# Patient Record
Sex: FEMALE | Race: BLACK OR AFRICAN AMERICAN | NOT HISPANIC OR LATINO | Employment: FULL TIME | URBAN - METROPOLITAN AREA
[De-identification: names, ages, dates, MRNs, and addresses within clinical notes are randomized per-mention and may not be internally consistent; named-entity substitution may affect disease eponyms.]

---

## 2023-07-03 ENCOUNTER — HOSPITAL ENCOUNTER (EMERGENCY)
Facility: HOSPITAL | Age: 63
Discharge: HOME | End: 2023-07-03
Attending: EMERGENCY MEDICINE
Payer: COMMERCIAL

## 2023-07-03 VITALS
DIASTOLIC BLOOD PRESSURE: 113 MMHG | OXYGEN SATURATION: 99 % | RESPIRATION RATE: 20 BRPM | SYSTOLIC BLOOD PRESSURE: 208 MMHG | TEMPERATURE: 97.6 F | HEART RATE: 89 BPM

## 2023-07-03 DIAGNOSIS — T18.108A FOREIGN BODY IN ESOPHAGUS, INITIAL ENCOUNTER: ICD-10-CM

## 2023-07-03 DIAGNOSIS — R09.89 GLOBUS SENSATION: Primary | ICD-10-CM

## 2023-07-03 PROCEDURE — 99283 EMERGENCY DEPT VISIT LOW MDM: CPT

## 2023-07-03 RX ORDER — SUCRALFATE 1 G/1
1 TABLET ORAL 4 TIMES DAILY
Qty: 28 TABLET | Refills: 0 | Status: SHIPPED | OUTPATIENT
Start: 2023-07-03 | End: 2023-07-10

## 2023-07-03 RX ORDER — OMEPRAZOLE 20 MG/1
20 CAPSULE, DELAYED RELEASE ORAL DAILY
Qty: 14 CAPSULE | Refills: 0 | Status: SHIPPED | OUTPATIENT
Start: 2023-07-03 | End: 2023-07-17

## 2023-07-03 RX ORDER — LIDOCAINE HYDROCHLORIDE 20 MG/ML
15 SOLUTION OROPHARYNGEAL ONCE
Status: COMPLETED | OUTPATIENT
Start: 2023-07-03 | End: 2023-07-03

## 2023-07-03 RX ORDER — SUCRALFATE 1 G/1
1 TABLET ORAL ONCE
Status: COMPLETED | OUTPATIENT
Start: 2023-07-03 | End: 2023-07-03

## 2023-07-03 RX ORDER — PANTOPRAZOLE SODIUM 40 MG/1
40 TABLET, DELAYED RELEASE ORAL ONCE
Status: COMPLETED | OUTPATIENT
Start: 2023-07-03 | End: 2023-07-03

## 2023-07-03 RX ORDER — MAGNESIUM HYDROXIDE/ALUMINUM HYDROXICE/SIMETHICONE 120; 1200; 1200 MG/30ML; MG/30ML; MG/30ML
30 SUSPENSION ORAL ONCE
Status: COMPLETED | OUTPATIENT
Start: 2023-07-03 | End: 2023-07-03

## 2023-07-03 RX ADMIN — ALUMINUM HYDROXIDE, MAGNESIUM HYDROXIDE, AND DIMETHICONE 30 ML: 200; 20; 200 SUSPENSION ORAL at 06:35

## 2023-07-03 RX ADMIN — PANTOPRAZOLE SODIUM 40 MG: 40 TABLET, DELAYED RELEASE ORAL at 07:54

## 2023-07-03 RX ADMIN — SUCRALFATE 1 G: 1 TABLET ORAL at 07:53

## 2023-07-03 RX ADMIN — Medication 15 ML: at 06:35

## 2023-07-03 NOTE — ED NOTES
Patient provided w/ carbonated beverage to attempt to dislodge foreign body (per provider Shugars)     Nina Aden RN  07/03/23 4129

## 2023-07-03 NOTE — ED PROVIDER NOTES
History  Chief Complaint   Patient presents with   • Foreign Body in Throat     Pt reports taking vitamins this morning and they are stuck in her throat     Patient is a 24-year-old female with PMH of HTN presenting for evaluation of foreign body in throat. She reports she was taking her multivitamins this morning, a total of 3 pills, when she feels that they clumped together and got stuck in her throat. 2 are capsules and 1 is a tab. She notes despite drinking a glass of water she could not pass the pills. She notes an increase in coughing and feeling like there is a noisiness of her breathing since getting the pill stuck. She feels the foreign body sensation on the right side of her throat. She denies difficulty breathing and shortness of breath. She denies difficulty swallowing but endorses globus sensation and mildly painful swallowing. She denies other complaints. History provided by:  Patient   used: No        Prior to Admission Medications   Prescriptions Last Dose Informant Patient Reported? Taking?   valsartan (DIOVAN) 160 mg tablet   No No   Sig: Take 1 tablet (160 mg total) by mouth daily      Facility-Administered Medications: None       Past Medical History:   Diagnosis Date   • Hypertension        Past Surgical History:   Procedure Laterality Date   • HERNIA REPAIR         Family History   Problem Relation Age of Onset   • Hypertension Mother      I have reviewed and agree with the history as documented.     E-Cigarette/Vaping   • E-Cigarette Use Never User      E-Cigarette/Vaping Substances   • Nicotine No    • THC No    • CBD No    • Flavoring No    • Other No    • Unknown No      Social History     Tobacco Use   • Smoking status: Never   • Smokeless tobacco: Never   Vaping Use   • Vaping Use: Never used   Substance Use Topics   • Alcohol use: No   • Drug use: No       Review of Systems   HENT: Positive for sore throat (Right-sided) and voice change (More raspy than baseline per pt). Negative for trouble swallowing (Denies difficulty swallowing, slightly painful swallowing and globus sensation and right-sided throat). Respiratory: Positive for cough (Dry cough since ingestion of pills). Negative for choking, shortness of breath, wheezing and stridor (" Noisiness of breathing" since getting pill stuck in throat). Cardiovascular: Negative for chest pain. Gastrointestinal: Negative for abdominal pain, nausea and vomiting. Musculoskeletal: Positive for neck pain (Right-sided where she feels pills are stuck). Negative for neck stiffness. All other systems reviewed and are negative. Physical Exam  Physical Exam  Vitals and nursing note reviewed. Constitutional:       General: She is in acute distress (Evidencing mild discomfort and distress). Appearance: Normal appearance. She is not ill-appearing, toxic-appearing or diaphoretic. HENT:      Head: Normocephalic and atraumatic. Jaw: There is normal jaw occlusion. No trismus. Nose: Nose normal.      Mouth/Throat:      Lips: Pink. Mouth: Mucous membranes are moist. No injury or oral lesions. Dentition: Normal dentition. Tongue: No lesions. Palate: No lesions. Pharynx: Oropharynx is clear. Uvula midline. No pharyngeal swelling, oropharyngeal exudate, posterior oropharyngeal erythema or uvula swelling. Tonsils: No tonsillar exudate or tonsillar abscesses. 0 on the right. 0 on the left. Comments: No foreign body visualized in oropharynx  Eyes:      Extraocular Movements: Extraocular movements intact. Conjunctiva/sclera: Conjunctivae normal.   Neck:      Trachea: Trachea normal. No tracheal tenderness or abnormal tracheal secretions. Cardiovascular:      Rate and Rhythm: Normal rate. Pulses:           Radial pulses are 2+ on the right side and 2+ on the left side.       Heart sounds: Normal heart sounds, S1 normal and S2 normal.   Pulmonary:      Effort: Pulmonary effort is normal. No tachypnea, accessory muscle usage, respiratory distress or retractions. Breath sounds: Normal breath sounds and air entry. No stridor (No other but slightly turbulent airflow through bilateral sides of neck), decreased air movement or transmitted upper airway sounds. No decreased breath sounds, wheezing, rhonchi or rales. Musculoskeletal:         General: Normal range of motion. Cervical back: Full passive range of motion without pain, normal range of motion and neck supple. Lymphadenopathy:      Cervical: No cervical adenopathy. Skin:     General: Skin is warm and dry. Capillary Refill: Capillary refill takes less than 2 seconds. Neurological:      General: No focal deficit present. Mental Status: She is alert and oriented to person, place, and time. Mental status is at baseline. GCS: GCS eye subscore is 4. GCS verbal subscore is 5. GCS motor subscore is 6.          Vital Signs  ED Triage Vitals   Temperature Pulse Respirations Blood Pressure SpO2   07/03/23 0558 07/03/23 0556 07/03/23 0556 07/03/23 0556 07/03/23 0556   97.6 °F (36.4 °C) 89 20 (!) 208/113 99 %      Temp Source Heart Rate Source Patient Position - Orthostatic VS BP Location FiO2 (%)   07/03/23 0558 07/03/23 0556 07/03/23 0556 07/03/23 0556 --   Oral Monitor Sitting Right arm       Pain Score       --                  Vitals:    07/03/23 0556   BP: (!) 208/113   Pulse: 89   Patient Position - Orthostatic VS: Sitting         Visual Acuity      ED Medications  Medications   Lidocaine Viscous HCl (XYLOCAINE) 2 % mucosal solution 15 mL (15 mL Swish & Spit Given 7/3/23 0635)   aluminum-magnesium hydroxide-simethicone (MYLANTA) oral suspension 30 mL (30 mL Oral Given 7/3/23 0635)   pantoprazole (PROTONIX) EC tablet 40 mg (40 mg Oral Given 7/3/23 6044)   sucralfate (CARAFATE) tablet 1 g (1 g Oral Given 7/3/23 7553)       Diagnostic Studies  Results Reviewed     None                 No orders to display              Procedures  Procedures         ED Course  ED Course as of 07/03/23 0811   [facility] Jul 03, 2023   0559 Triage vital signs notable for elevated BP of 208/113, otherwise within normal limits                                             Medical Decision Making  DDx including but not limited to: Esophageal foreign body, globus sensation; no airway compromise; considered but doubt esophageal spasm, contracted esophagus, or achalasia    Patient is a 57-year-old female presenting for evaluation of globus sensation and right-sided throat after taking 3 pills this morning. Despite drinking water at home she has been unable to pass the "clump" of pills. Her triage vital signs are notable for elevated BP for which she reports a history of HTN and takes valsartan. She notes not having BPs as high normally and has not taken her antihypertensive yet today. She attributes the elevated BP to anxiety/frustration with inability to pass the pills. She denies red flag symptoms of CP/SOB, headache, lightheadedness/dizziness, and vision changes. No indication to further work-up hypertension given asymptomatic. On physical exam, patient is evidencing mild discomfort and distress. Her physical exam is with slight increased turbulent flow through the upper airway, otherwise no stridor, difficulty tolerating secretions, or abnormal breath sounds. She is breathing comfortably and without drooling. She is protecting her airway. Doubt aspiration into trachea or lungs. Suspicion highest for esophageal foreign body versus globus sensation. Will trial Mylanta and lidocaine. On reassessment, patient notes foreign body sensation is lessening. She notes an ongoing coarse, spastic cough. Breath sounds remain clear throughout. She is breathing comfortably in no acute distress. She is tolerating liquids without difficulty or choking.   She notes she vomited 1 time after getting into a coughing fit and reports it appeared to be applesauce but no pills visualized. She denies nausea currently and demonstrates swallowing in front of this provider without difficulty or choking. Plan made for omeprazole and Carafate (dissolved prior to ingestion) Rx. Patient educated on very strict return precautions including new or worsening pain, difficulty swallowing, difficulty breathing, choking, drooling, fevers, and vomiting. She is in agreement with plan and has no further questions at this time. Patient with improved appearance, in no acute distress, and ambulatory with steady gait at time of discharge. Foreign body in esophagus, initial encounter: acute illness or injury  Globus sensation: acute illness or injury  Risk  OTC drugs. Prescription drug management. Disposition  Final diagnoses:   Globus sensation   Foreign body in esophagus, initial encounter     Time reflects when diagnosis was documented in both MDM as applicable and the Disposition within this note     Time User Action Codes Description Comment    7/3/2023  7:39 AM Lia Sheri Add [R09.89] Globus sensation     7/3/2023  7:41 AM Lia Sheri Add [U20.994B] Foreign body in esophagus, initial encounter       ED Disposition     ED Disposition   Discharge    Condition   Stable    Date/Time   Mon Jul 3, 2023  7:39 AM    Comment   Shawn Altman discharge to home/self care.                Follow-up Information     Follow up With Specialties Details Why Contact Info Additional Information    Christopher Young MD Family Medicine Schedule an appointment as soon as possible for a visit in 2 days  [address]  [address] [address]  [phone]       [address] [place] [address] Way Emergency Department Emergency Medicine Go to  If symptoms worsen [address], [place], [place], [address]          Discharge Medication List as of 7/3/2023  7:44 AM      START taking these medications    Details   omeprazole (PriLOSEC) 20 mg delayed release capsule Take 1 capsule (20 mg total) by mouth daily for 14 days, Starting Mon 7/3/2023, Until Mon 7/17/2023, Normal      sucralfate (CARAFATE) 1 g tablet Take 1 tablet (1 g total) by mouth 4 (four) times a day for 7 days, Starting Mon 7/3/2023, Until Mon 7/10/2023, Normal         CONTINUE these medications which have NOT CHANGED    Details   valsartan (DIOVAN) 160 mg tablet Take 1 tablet (160 mg total) by mouth daily, Starting Tue 8/2/2022, Normal             No discharge procedures on file.     PDMP Review     None          ED Provider  Electronically Signed by           Qian Wilson [address] [place] [address]  07/03/23 1751

## 2023-07-26 ENCOUNTER — VBI (OUTPATIENT)
Dept: ADMINISTRATIVE | Facility: OTHER | Age: 63
End: 2023-07-26

## 2023-12-14 ENCOUNTER — VBI (OUTPATIENT)
Dept: ADMINISTRATIVE | Facility: OTHER | Age: 63
End: 2023-12-14

## 2024-01-10 ENCOUNTER — HOSPITAL ENCOUNTER (OUTPATIENT)
Dept: RADIOLOGY | Facility: HOSPITAL | Age: 64
Discharge: HOME | End: 2024-01-10
Payer: COMMERCIAL

## 2024-01-10 ENCOUNTER — OFFICE VISIT (OUTPATIENT)
Dept: FAMILY MEDICINE CLINIC | Facility: CLINIC | Age: 64
End: 2024-01-10
Payer: COMMERCIAL

## 2024-01-10 VITALS
BODY MASS INDEX: 29.16 KG/M2 | TEMPERATURE: 97.4 F | DIASTOLIC BLOOD PRESSURE: 70 MMHG | RESPIRATION RATE: 13 BRPM | OXYGEN SATURATION: 97 % | WEIGHT: 175 LBS | HEIGHT: 65 IN | SYSTOLIC BLOOD PRESSURE: 148 MMHG | HEART RATE: 64 BPM

## 2024-01-10 DIAGNOSIS — R22.31 LOCALIZED SWELLING ON RIGHT HAND: Primary | ICD-10-CM

## 2024-01-10 DIAGNOSIS — R22.31 LOCALIZED SWELLING ON RIGHT HAND: ICD-10-CM

## 2024-01-10 DIAGNOSIS — I10 ESSENTIAL HYPERTENSION: ICD-10-CM

## 2024-01-10 PROCEDURE — 99214 OFFICE O/P EST MOD 30 MIN: CPT

## 2024-01-10 PROCEDURE — 73130 X-RAY EXAM OF HAND: CPT

## 2024-01-10 RX ORDER — METHYLPREDNISOLONE 4 MG/1
TABLET ORAL
Qty: 21 EACH | Refills: 0 | Status: SHIPPED | OUTPATIENT
Start: 2024-01-10

## 2024-01-10 NOTE — PROGRESS NOTES
Name: Perla Mccall      : 1960      MRN: [id number]  Encounter Provider: MARY ELLEN Turner  Encounter Date: 1/10/2024   Encounter department: [facility] MEDICINE    Assessment & Plan     1. Localized swelling on right hand  Assessment & Plan:  R hand swelling and pain x 4 days not improving with NSAID. Pt states has tried compression therapy during daytime. Pt reports increased activity on Saturday requiring use of hand however no injury or increased sodium intake. Pt reports similar issue a few years ago that resolved with NSAID use. On exam pt with sever tenderness of R hand and fingers, moderate swelling of hand and fingers with decreased ROM, neuro exam wnl. Start medrol pack as prescribed recommend extra strength tylenol for pain,  wrist splint. Will check xray and check for autoimmune and rheumatoid disease.    Orders:  -     methylPREDNISolone 4 MG tablet therapy pack; Use as directed on package  -     XR hand 3+ vw right; Future; Expected date: 01/10/2024  -     SADE Comprehensive Panel; Future  -     RF Screen w/ Reflex to Titer; Future  -     CBC; Future    2. Essential hypertension  Assessment & Plan:  BP elevated in office pt no longer taking valsartan 160 mg. Pt reports stable BP denies high Na diet, will monitor and f/u at physical appt to discuss restarting medication. Check CMP.     Orders:  -     Comprehensive metabolic panel; Future; Expected date: 2024           Subjective      Pt states was taking her tree down on Saturday then proceeded to wash her hair and on  woke up with swollen R hand and fingers. Pt has been taking OTC NSAID and wrapping hand however swelling has not decreased. On exam pt with moderate swelling and decreased ROM fingers, denies any injury or high Na intake. Will check images and check autoimmune and inflammatory labs.      Review of Systems   Constitutional:  Negative for activity change, chills, fatigue and fever.  "  HENT:  Negative for facial swelling.    Eyes:  Positive for discharge.   Respiratory:  Negative for chest tightness.    Cardiovascular:  Negative for leg swelling.   Gastrointestinal:  Negative for abdominal distention and nausea.   Endocrine: Negative for cold intolerance.   Musculoskeletal:  Positive for arthralgias and joint swelling. Negative for back pain and myalgias.   Skin:  Negative for color change, pallor and wound.   Allergic/Immunologic: Negative for environmental allergies.   Neurological:  Negative for dizziness, tremors, syncope, facial asymmetry, weakness, light-headedness, numbness and headaches.   Psychiatric/Behavioral:  Negative for agitation.        Current Outpatient Medications on File Prior to Visit   Medication Sig    valsartan (DIOVAN) 160 mg tablet Take 1 tablet (160 mg total) by mouth daily    omeprazole (PriLOSEC) 20 mg delayed release capsule Take 1 capsule (20 mg total) by mouth daily for 14 days (Patient not taking: Reported on 1/10/2024)    sucralfate (CARAFATE) 1 g tablet Take 1 tablet (1 g total) by mouth 4 (four) times a day for 7 days (Patient not taking: Reported on 1/10/2024)       Objective     /70 (BP Location: Left arm, Patient Position: Sitting, Cuff Size: Standard)   Pulse 64   Temp (!) 97.4 °F (36.3 °C) (Tympanic)   Resp 13   Ht 5' 5\" (1.651 m)   Wt 79.4 kg (175 lb)   SpO2 97%   BMI 29.12 kg/m²     Physical Exam  Vitals and nursing note reviewed.   Constitutional:       General: She is not in acute distress.     Appearance: Normal appearance. She is not ill-appearing, toxic-appearing or diaphoretic.   HENT:      Head: Normocephalic and atraumatic.      Right Ear: There is no impacted cerumen.      Left Ear: There is no impacted cerumen.      Nose: Nose normal. No congestion.      Mouth/Throat:      Mouth: Mucous membranes are moist.      Pharynx: Oropharynx is clear.   Eyes:      Conjunctiva/sclera: Conjunctivae normal.      Pupils: Pupils are equal, " round, and reactive to light.   Cardiovascular:      Rate and Rhythm: Normal rate and regular rhythm.      Pulses: Normal pulses.      Heart sounds: Normal heart sounds.   Pulmonary:      Effort: Pulmonary effort is normal. No respiratory distress.      Breath sounds: Normal breath sounds. No stridor. No wheezing, rhonchi or rales.   Chest:      Chest wall: No tenderness.   Abdominal:      General: Bowel sounds are normal.      Tenderness: There is no abdominal tenderness.   Musculoskeletal:         General: Swelling and tenderness present. No deformity or signs of injury.      Right wrist: No swelling, bony tenderness or crepitus. Normal range of motion. Normal pulse.      Right hand: Swelling, tenderness and bony tenderness present. No deformity or lacerations. Decreased range of motion. Normal strength. Normal sensation. There is no disruption of two-point discrimination. Normal capillary refill. Normal pulse.      Cervical back: Normal range of motion.   Skin:     General: Skin is warm.      Capillary Refill: Capillary refill takes less than 2 seconds.      Coloration: Skin is not jaundiced or pale.      Findings: No bruising, erythema, lesion or rash.   Neurological:      Mental Status: She is alert and oriented to person, place, and time.      Cranial Nerves: No cranial nerve deficit.      Motor: No weakness.   Psychiatric:         Mood and Affect: Mood normal.         Behavior: Behavior normal.       MARY ELLEN Turner

## 2024-01-10 NOTE — ASSESSMENT & PLAN NOTE
BP elevated in office pt no longer taking valsartan 160 mg. Pt reports stable BP denies high Na diet, will monitor and f/u at physical appt to discuss restarting medication. Check CMP.

## 2024-01-10 NOTE — ASSESSMENT & PLAN NOTE
R hand swelling and pain x 4 days not improving with NSAID. Pt states has tried compression therapy during daytime. Pt reports increased activity on Saturday requiring use of hand however no injury or increased sodium intake. Pt reports similar issue a few years ago that resolved with NSAID use. On exam pt with sever tenderness of R hand and fingers, moderate swelling of hand and fingers with decreased ROM, neuro exam wnl. Start medrol pack as prescribed recommend extra strength tylenol for pain,  wrist splint. Will check xray and check for autoimmune and rheumatoid disease.

## 2024-01-10 NOTE — LETTER
January 10, 2024     Patient: Perla Mccall  YOB: 1960  Date of Visit: 1/10/2024      To Whom it May Concern:    Perla Mccall is under my professional care. Perla was seen in my office on 1/10/2024. Please excuse Perla from work 1/10-1/12/24 may return to work on 1/15/24 .    If you have any questions or concerns, please don't hesitate to call.         Sincerely,          MARY ELLEN Turner

## 2024-01-16 ENCOUNTER — TELEPHONE (OUTPATIENT)
Age: 64
End: 2024-01-16

## 2024-01-16 NOTE — TELEPHONE ENCOUNTER
Patient called in for results to labs ordered on 1/10/24; reports labs completed on Thursday 1/11 at [facility]. RN could not find results sent over and scanned into chart. Patient will follow up with [facility] labs.

## 2024-01-17 ENCOUNTER — TELEPHONE (OUTPATIENT)
Age: 64
End: 2024-01-17

## 2024-01-17 NOTE — TELEPHONE ENCOUNTER
Patient stated that she went last week on Thursday for her labs. Patient stated that she went to [facility] in [place].  Natalia Garrett   Please check [facility] portal for this. And call patient back

## 2024-01-18 LAB
ANA PAT SER IF-IMP: ABNORMAL
ANA PAT SER-IMP: ABNORMAL
ANA SER QL IF: POSITIVE
ANA TITR SER IF: ABNORMAL TITER
ANA TITR SER IF: ABNORMAL TITER
BASOPHILS # BLD AUTO: 53 CELLS/UL (ref 0–200)
BASOPHILS NFR BLD AUTO: 0.5 %
EOSINOPHIL # BLD AUTO: 105 CELLS/UL (ref 15–500)
EOSINOPHIL NFR BLD AUTO: 1 %
ERYTHROCYTE [DISTWIDTH] IN BLOOD BY AUTOMATED COUNT: 13.7 % (ref 11–15)
HCT VFR BLD AUTO: 41.8 % (ref 35–45)
HGB BLD-MCNC: 13.9 G/DL (ref 11.7–15.5)
LYMPHOCYTES # BLD AUTO: 2720 CELLS/UL (ref 850–3900)
LYMPHOCYTES NFR BLD AUTO: 25.9 %
MCH RBC QN AUTO: 28.8 PG (ref 27–33)
MCHC RBC AUTO-ENTMCNC: 33.3 G/DL (ref 32–36)
MCV RBC AUTO: 86.7 FL (ref 80–100)
MONOCYTES # BLD AUTO: 714 CELLS/UL (ref 200–950)
MONOCYTES NFR BLD AUTO: 6.8 %
NEUTROPHILS # BLD AUTO: 6909 CELLS/UL (ref 1500–7800)
NEUTROPHILS NFR BLD AUTO: 65.8 %
PLATELET # BLD AUTO: 250 THOUSAND/UL (ref 140–400)
PMV BLD REES-ECKER: 10.8 FL (ref 7.5–12.5)
RBC # BLD AUTO: 4.82 MILLION/UL (ref 3.8–5.1)
RHEUMATOID FACT SERPL-ACNC: <14 IU/ML
WBC # BLD AUTO: 10.5 THOUSAND/UL (ref 3.8–10.8)

## 2024-01-19 DIAGNOSIS — R76.8 POSITIVE ANA (ANTINUCLEAR ANTIBODY): Primary | ICD-10-CM

## 2024-01-22 ENCOUNTER — TELEPHONE (OUTPATIENT)
Age: 64
End: 2024-01-22

## 2024-03-29 ENCOUNTER — OFFICE VISIT (OUTPATIENT)
Dept: FAMILY MEDICINE CLINIC | Facility: CLINIC | Age: 64
End: 2024-03-29
Payer: COMMERCIAL

## 2024-03-29 VITALS
HEART RATE: 71 BPM | OXYGEN SATURATION: 95 % | HEIGHT: 66 IN | DIASTOLIC BLOOD PRESSURE: 120 MMHG | TEMPERATURE: 97.9 F | WEIGHT: 182 LBS | SYSTOLIC BLOOD PRESSURE: 150 MMHG | BODY MASS INDEX: 29.25 KG/M2 | RESPIRATION RATE: 16 BRPM

## 2024-03-29 DIAGNOSIS — I10 ESSENTIAL HYPERTENSION: ICD-10-CM

## 2024-03-29 DIAGNOSIS — M54.6 ACUTE LEFT-SIDED THORACIC BACK PAIN: Primary | ICD-10-CM

## 2024-03-29 PROCEDURE — 99214 OFFICE O/P EST MOD 30 MIN: CPT

## 2024-03-29 RX ORDER — ACETAMINOPHEN 500 MG
1000 TABLET ORAL EVERY 6 HOURS PRN
Qty: 60 TABLET | Refills: 1 | Status: SHIPPED | OUTPATIENT
Start: 2024-03-29

## 2024-03-29 RX ORDER — CYCLOBENZAPRINE HCL 10 MG
10 TABLET ORAL 3 TIMES DAILY PRN
Qty: 20 TABLET | Refills: 0 | Status: SHIPPED | OUTPATIENT
Start: 2024-03-29

## 2024-03-29 RX ORDER — METHYLPREDNISOLONE 4 MG/1
TABLET ORAL
Qty: 21 EACH | Refills: 0 | Status: SHIPPED | OUTPATIENT
Start: 2024-03-29

## 2024-03-29 NOTE — ASSESSMENT & PLAN NOTE
BP elevated in office secondary to  acute pain. Pt  no longer taking valsartan 160 mg states BP normally stable educate monitor at home and f/u for consistently elevated readings.

## 2024-03-29 NOTE — ASSESSMENT & PLAN NOTE
Pt reports poss straining muscle a week ago unsure if from movement at work or exacerbated during workout at gym. On exam pt has severe tenderness to L thoracic back on light palpation, pain exacerbated with movement does not radiate. Pt denies SOB, cough, chest pain, fever. Pt denies any urinary symptoms. Start medrol pack and acetaminophen 1000 mg q 6hrs prn , recommend heat therapy. Pt declined physical therapy states very active, encourage stretching exercises and f/u for persistent pain.

## 2024-03-29 NOTE — LETTER
March 29, 2024     Patient: Perla Mccall  YOB: 1960  Date of Visit: 3/29/2024      To Whom it May Concern:    Perla Mccall is under my professional care. Perla was seen in my office on 3/29/2024. Please excuse Perla from work 3/29/24-04/04/24.    If you have any questions or concerns, please don't hesitate to call.         Sincerely,          MARY ELLEN Turner

## 2024-03-29 NOTE — PROGRESS NOTES
Name: Perla Mccall      : 1960      MRN: [id number]  Encounter Provider: MARY ELLEN Turner  Encounter Date: 3/29/2024   Encounter department: [facility] MEDICINE    Assessment & Plan     1. Acute left-sided thoracic back pain  Assessment & Plan:  Pt reports poss straining muscle a week ago unsure if from movement at work or exacerbated during workout at gym. On exam pt has severe tenderness to L thoracic back on light palpation, pain exacerbated with movement does not radiate. Pt denies SOB, cough, chest pain, fever. Pt denies any urinary symptoms. Start medrol pack and acetaminophen 1000 mg q 6hrs prn , recommend heat therapy. Pt declined physical therapy states very active, encourage stretching exercises and f/u for persistent pain.    Orders:  -     methylPREDNISolone 4 MG tablet therapy pack; Use as directed on package  -     cyclobenzaprine (FLEXERIL) 10 mg tablet; Take 1 tablet (10 mg total) by mouth 3 (three) times a day as needed for muscle spasms  -     acetaminophen (TYLENOL) 500 mg tablet; Take 2 tablets (1,000 mg total) by mouth every 6 (six) hours as needed for moderate pain  -     XR spine thoracic 3 vw; Future; Expected date: 2024    2. Essential hypertension  Assessment & Plan:  BP elevated in office secondary to  acute pain. Pt  no longer taking valsartan 160 mg states BP normally stable educate monitor at home and f/u for consistently elevated readings.              Subjective      Pt presents with c/o low back pain pt states twisted at work on 3/24/25 pt reports went to the gym next day  and since then has been having severe L lower back pain. Pain is exacerbated with movement pt has decreased ROM, denies numbness or tingling in b/l lower extremity. Pt reports taking advil pm and using heat therapy which was ineffective.       Review of Systems   Constitutional:  Negative for appetite change, chills, fatigue and fever.   HENT:  Negative for  "congestion and mouth sores.    Respiratory:  Negative for cough, chest tightness and shortness of breath.    Cardiovascular:  Negative for chest pain.   Gastrointestinal:  Negative for abdominal distention, abdominal pain, anal bleeding, blood in stool, nausea and vomiting.   Genitourinary:  Negative for difficulty urinating, dysuria, flank pain, frequency, menstrual problem, pelvic pain, urgency, vaginal bleeding, vaginal discharge and vaginal pain.   Musculoskeletal:  Positive for back pain and gait problem (antalgic). Negative for joint swelling, myalgias, neck pain and neck stiffness.   Allergic/Immunologic: Negative for environmental allergies.   Neurological:  Negative for dizziness, tremors, seizures, syncope, speech difficulty, weakness, numbness and headaches.   Psychiatric/Behavioral:  Negative for agitation, decreased concentration, hallucinations, self-injury, sleep disturbance and suicidal ideas. The patient is not nervous/anxious and is not hyperactive.        Current Outpatient Medications on File Prior to Visit   Medication Sig    valsartan (DIOVAN) 160 mg tablet Take 1 tablet (160 mg total) by mouth daily    omeprazole (PriLOSEC) 20 mg delayed release capsule Take 1 capsule (20 mg total) by mouth daily for 14 days (Patient not taking: Reported on 1/10/2024)    sucralfate (CARAFATE) 1 g tablet Take 1 tablet (1 g total) by mouth 4 (four) times a day for 7 days (Patient not taking: Reported on 1/10/2024)    [DISCONTINUED] methylPREDNISolone 4 MG tablet therapy pack Use as directed on package (Patient not taking: Reported on 3/29/2024)       Objective     BP (!) 150/120 (BP Location: Left arm, Patient Position: Sitting, Cuff Size: Standard)   Pulse 71   Temp 97.9 °F (36.6 °C) (Tympanic)   Resp 16   Ht 5' 5.5\" (1.664 m)   Wt 82.6 kg (182 lb)   SpO2 95%   BMI 29.83 kg/m²     Physical Exam  Vitals and nursing note reviewed.   Constitutional:       General: She is not in acute distress.     " Appearance: Normal appearance. She is normal weight. She is not ill-appearing, toxic-appearing or diaphoretic.   HENT:      Head: Normocephalic and atraumatic.      Right Ear: Tympanic membrane, ear canal and external ear normal. There is no impacted cerumen.      Left Ear: Tympanic membrane, ear canal and external ear normal. There is no impacted cerumen.      Nose: Nose normal. No congestion or rhinorrhea.      Mouth/Throat:      Mouth: Mucous membranes are moist.      Pharynx: Oropharynx is clear. No oropharyngeal exudate or posterior oropharyngeal erythema.   Eyes:      General:         Right eye: No discharge.         Left eye: No discharge.      Extraocular Movements: Extraocular movements intact.      Conjunctiva/sclera: Conjunctivae normal.      Pupils: Pupils are equal, round, and reactive to light.   Neck:      Vascular: No carotid bruit.   Cardiovascular:      Rate and Rhythm: Normal rate and regular rhythm.      Pulses: Normal pulses.      Heart sounds: Normal heart sounds. No murmur heard.  Pulmonary:      Effort: Pulmonary effort is normal. No respiratory distress.      Breath sounds: Normal breath sounds. No stridor. No wheezing, rhonchi or rales.   Chest:      Chest wall: No tenderness.   Abdominal:      General: Bowel sounds are normal. There is no distension.      Palpations: Abdomen is soft. There is no shifting dullness, fluid wave, hepatomegaly, splenomegaly, mass or pulsatile mass.      Tenderness: There is no abdominal tenderness. There is no right CVA tenderness, left CVA tenderness, guarding or rebound. Negative signs include Mcgee's sign, Rovsing's sign, McBurney's sign, psoas sign and obturator sign.      Hernia: No hernia is present. There is no hernia in the umbilical area, ventral area, left inguinal area, right femoral area, left femoral area or right inguinal area.   Musculoskeletal:         General: No swelling, tenderness, deformity or signs of injury.      Cervical back: Normal  range of motion. No rigidity or tenderness.      Thoracic back: Spasms present. No swelling, edema, deformity, signs of trauma, lacerations or bony tenderness. Decreased range of motion. No scoliosis.      Right lower leg: No edema.      Left lower leg: No edema.   Lymphadenopathy:      Cervical: No cervical adenopathy.   Skin:     General: Skin is warm.      Capillary Refill: Capillary refill takes less than 2 seconds.      Coloration: Skin is not jaundiced.      Findings: No bruising, erythema or rash.   Neurological:      General: No focal deficit present.      Mental Status: She is alert and oriented to person, place, and time.      Cranial Nerves: No cranial nerve deficit.      Sensory: No sensory deficit.      Motor: No weakness.      Coordination: Coordination normal.      Gait: Gait abnormal (antalgic).      Deep Tendon Reflexes: Reflexes normal.   Psychiatric:         Mood and Affect: Mood normal.         Behavior: Behavior normal.         Thought Content: Thought content normal.         Judgment: Judgment normal.       MARY ELLEN Turner

## 2024-04-08 ENCOUNTER — TELEPHONE (OUTPATIENT)
Age: 64
End: 2024-04-08

## 2024-04-08 NOTE — TELEPHONE ENCOUNTER
Pt is very overdue for physical and interval visit and care gaps needs to  make 30 minute appt  can give 1 month refill

## 2024-04-08 NOTE — TELEPHONE ENCOUNTER
Pt called to get a refill on med that was discontinued.    Pt is requesting losartan w potassium. Pt states BP was high at last visit and at home it was 150/93.    Pharmacy Verified: [facility] [place]

## 2024-04-09 DIAGNOSIS — I10 ESSENTIAL HYPERTENSION: ICD-10-CM

## 2024-04-09 RX ORDER — VALSARTAN 160 MG/1
160 TABLET ORAL DAILY
Qty: 30 TABLET | Refills: 0 | Status: SHIPPED | OUTPATIENT
Start: 2024-04-09

## 2024-04-10 ENCOUNTER — VBI (OUTPATIENT)
Dept: ADMINISTRATIVE | Facility: OTHER | Age: 64
End: 2024-04-10

## 2024-05-09 ENCOUNTER — VBI (OUTPATIENT)
Dept: ADMINISTRATIVE | Facility: OTHER | Age: 64
End: 2024-05-09

## 2024-07-16 ENCOUNTER — VBI (OUTPATIENT)
Dept: ADMINISTRATIVE | Facility: OTHER | Age: 64
End: 2024-07-16

## 2024-07-16 NOTE — TELEPHONE ENCOUNTER
07/16/24 8:16 AM     Chart reviewed for CRC: Colonoscopy ; nothing is submitted to the patient's insurance at this time.     SARWAT FALCON [facility]

## 2024-09-12 ENCOUNTER — VBI (OUTPATIENT)
Dept: ADMINISTRATIVE | Facility: OTHER | Age: 64
End: 2024-09-12

## 2024-09-12 NOTE — TELEPHONE ENCOUNTER
09/12/24 8:55 AM     Chart reviewed for Pap Smear (HPV) aka Cervical Cancer Screening ; nothing is submitted to the patient's insurance at this time.     SARWAT FALCON MA   [facility]

## 2024-12-10 ENCOUNTER — VBI (OUTPATIENT)
Dept: ADMINISTRATIVE | Facility: OTHER | Age: 64
End: 2024-12-10

## 2024-12-10 NOTE — TELEPHONE ENCOUNTER
12/10/24 11:15 AM     Chart reviewed for   Cervical Cancer Screening    ; nothing is submitted to the patient's insurance at this time.     SARWAT FALCON MA   [facility]

## 2025-03-04 ENCOUNTER — VBI (OUTPATIENT)
Dept: ADMINISTRATIVE | Facility: OTHER | Age: 65
End: 2025-03-04

## 2025-03-04 NOTE — TELEPHONE ENCOUNTER
03/04/25 12:40 PM     Chart reviewed for   Cervical Cancer Screening    ; nothing is submitted to the patient's insurance at this time.     SARWAT FALCON MA   [facility]

## 2025-05-29 ENCOUNTER — VBI (OUTPATIENT)
Dept: ADMINISTRATIVE | Facility: OTHER | Age: 65
End: 2025-05-29

## 2025-05-29 NOTE — TELEPHONE ENCOUNTER
05/29/25 2:08 PM     Chart reviewed for Mammogram ; nothing is submitted to the patient's insurance at this time.     Sharmila Strickland MA   [facility]

## 2025-06-02 ENCOUNTER — OFFICE VISIT (OUTPATIENT)
Dept: FAMILY MEDICINE CLINIC | Facility: CLINIC | Age: 65
End: 2025-06-02
Payer: COMMERCIAL

## 2025-06-02 VITALS
HEIGHT: 66 IN | BODY MASS INDEX: 27.03 KG/M2 | OXYGEN SATURATION: 97 % | DIASTOLIC BLOOD PRESSURE: 90 MMHG | SYSTOLIC BLOOD PRESSURE: 144 MMHG | TEMPERATURE: 98.2 F | WEIGHT: 168.2 LBS | HEART RATE: 68 BPM | RESPIRATION RATE: 18 BRPM

## 2025-06-02 DIAGNOSIS — M77.01 MEDIAL EPICONDYLITIS OF RIGHT ELBOW: Primary | ICD-10-CM

## 2025-06-02 PROCEDURE — 99214 OFFICE O/P EST MOD 30 MIN: CPT | Performed by: FAMILY MEDICINE

## 2025-06-02 RX ORDER — PREDNISONE 20 MG/1
20 TABLET ORAL 2 TIMES DAILY WITH MEALS
Qty: 10 TABLET | Refills: 0 | Status: SHIPPED | OUTPATIENT
Start: 2025-06-02 | End: 2025-06-07

## 2025-06-02 RX ORDER — CELECOXIB 200 MG/1
200 CAPSULE ORAL 2 TIMES DAILY
Qty: 60 CAPSULE | Refills: 0 | Status: SHIPPED | OUTPATIENT
Start: 2025-06-02

## 2025-06-02 RX ORDER — BACLOFEN 10 MG/1
10 TABLET ORAL
Qty: 30 TABLET | Refills: 0 | Status: SHIPPED | OUTPATIENT
Start: 2025-06-02 | End: 2025-06-13

## 2025-06-02 NOTE — PROGRESS NOTES
Name: Perla Mccall      : 1960     MRN: [id number]  Encounter Provider: Makenna Roland MD  Encounter Date: 2025  Encounter department: [facility]    Assessment & Plan  Medial epicondylitis of right elbow  Starting celecoxib 200 mg 1 tablet twice a day for 5 days and thereafter you can use it as needed.  This is anti-inflammatory medication take it after you eat something so it does not irritate your stomach.  If it does irritate your stomach you will use Pepcid as needed which is over-the-counter.  You will start taking prednisone 20 mg 1 tablet twice a day for 5 days.  This will also help with inflammation.  You will obtain the x-rays of the right elbow.  You can use baclofen as needed every 8 hours preferably at nighttime because this medication can make you sleepy.  Will follow-up with orthopedic specialist if the symptoms are persistent.  Follow-up if not improved  Orders:    XR elbow 3+ vw right; Future    celecoxib (CeleBREX) 200 mg capsule; Take 1 capsule (200 mg total) by mouth 2 (two) times a day    baclofen 10 mg tablet; Take 1 tablet (10 mg total) by mouth daily at bedtime as needed for muscle spasms    predniSONE 20 mg tablet; Take 1 tablet (20 mg total) by mouth 2 (two) times a day with meals for 5 days    Ambulatory Referral to Orthopedic Surgery; Future                   Read package inserts for all medications before starting a new medications, call me if you have any questions.    Patient was given opportunity to ask questions and all questions were answered.    Subjective:     Perla Mccall is a 64 y.o. female.    Presents with 1 month of right elbow pain which has been worsening.  She states that she cannot even grab anything with the right hand due to the pain.  Pain is mainly in the area and right upper arm.  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 She has never used smokeless tobacco. She reports that she does not drink alcohol and does not use drugs.    Current Medications[4]    The following portions of the patient's history were reviewed and updated as appropriate: allergies, current medications, past family history, past medical history, past social history, past surgical history and problem list.    Review of Systems   Constitutional:  Negative for fatigue and fever.   HENT:  Negative for congestion, facial swelling, mouth sores, rhinorrhea, sore throat and trouble swallowing.    Eyes:  Negative for pain and redness.   Respiratory:  Negative for cough, shortness of breath and wheezing.    Cardiovascular:  Negative for chest pain, palpitations and leg swelling.   Gastrointestinal:  Negative for abdominal pain, blood in stool, constipation, diarrhea and nausea.   Genitourinary:  Negative for dysuria, hematuria and urgency.   Musculoskeletal:  Positive for arthralgias and joint swelling. Negative for back pain and myalgias.   Skin:  Negative for rash and wound.   Neurological:  Negative for seizures, syncope and headaches.   Hematological:  Negative for adenopathy.   Psychiatric/Behavioral:  Negative for agitation and behavioral problems.          PHQ-2/9 Depression Screening    Little interest or pleasure in doing things: 0 - not at all  Feeling down, depressed, or hopeless: 0 - not at all  PHQ-2 Score: 0  PHQ-2 Interpretation: Negative depression screen             Objective:    /90 (BP Location: Left arm, Patient Position: Sitting, Cuff Size: Adult)   Pulse 68   Temp 98.2 °F (36.8 °C) (Tympanic)   Resp 18   Ht 5' 5.5\" (1.664 m)   Wt 76.3 kg (168 lb 3.2 oz)   SpO2 97%   BMI 27.56 kg/m²      Physical Exam  Vitals and nursing note reviewed.   Constitutional:       Appearance: Normal appearance. She is well-developed. She is not ill-appearing.   HENT:      Head: " Normocephalic and atraumatic.      Right Ear: External ear normal.      Left Ear: External ear normal.      Nose: Nose normal.      Mouth/Throat:      Mouth: Mucous membranes are moist.      Pharynx: No oropharyngeal exudate or posterior oropharyngeal erythema.     Eyes:      General: No scleral icterus.        Right eye: No discharge.         Left eye: No discharge.      Conjunctiva/sclera: Conjunctivae normal.       Cardiovascular:      Rate and Rhythm: Normal rate.      Heart sounds: No murmur heard.     No gallop.   Pulmonary:      Effort: Pulmonary effort is normal. No respiratory distress.      Breath sounds: Normal breath sounds. No stridor. No wheezing, rhonchi or rales.     Musculoskeletal:         General: Tenderness (medical epicondyle right elbow) present. No deformity.     Skin:     Findings: No erythema or rash.     Neurological:      Mental Status: She is alert. Mental status is at baseline.     Psychiatric:         Behavior: Behavior normal.         Judgment: Judgment normal.             Radiology/Other Diagnostic Testing Results: I have reviewed the following imaging and agree with the interpretation below.    XR hand 3+ vw right  Result Date: 1/10/2024  RIGHT HAND INDICATION:   Localized swelling, mass and lump, right upper limb. COMPARISON:  There are no previous examinations available for comparison. VIEWS:  XR HAND 3+ VW RIGHT For the purposes of institution wide universal language the following terms will apply: (thumb=1st digit/finger, index finger=2nd digit/finger, long finger=3rd digit/finger, ring=4th digit/finger and small finger=5th digit/finger) FINDINGS: There is no acute fracture or dislocation. No significant degenerative changes. No lytic or blastic osseous lesion. Soft tissues are unremarkable.     No acute osseous abnormality. Electronically signed: 01/10/2024 06:16 PM Edvin Holly MD       Disclaimer: Portions of the record may have been created with voice recognition software.  "Occasional wrong word or \"sound a like\" substitutions may have occurred due to the inherent limitations of voice recognition software. Read the chart carefully and recognize, using context, where substitutions have occurred. I have used the Epic copy/forward function to compose this note. I have reviewed my current note to ensure it reflects the current patient status, exam, assessment and plan.         [1]   Past Medical History:  Diagnosis Date    Hypertension    [2]   Family History  Problem Relation Name Age of Onset    Hypertension Mother     [3]   Past Surgical History:  Procedure Laterality Date    HERNIA REPAIR     [4]   Current Outpatient Medications:     acetaminophen (TYLENOL) 500 mg tablet, Take 2 tablets (1,000 mg total) by mouth every 6 (six) hours as needed for moderate pain (Patient not taking: Reported on 6/2/2025), Disp: 60 tablet, Rfl: 1    cyclobenzaprine (FLEXERIL) 10 mg tablet, Take 1 tablet (10 mg total) by mouth 3 (three) times a day as needed for muscle spasms (Patient not taking: Reported on 6/2/2025), Disp: 20 tablet, Rfl: 0    methylPREDNISolone 4 MG tablet therapy pack, Use as directed on package (Patient not taking: Reported on 6/2/2025), Disp: 21 each, Rfl: 0    omeprazole (PriLOSEC) 20 mg delayed release capsule, Take 1 capsule (20 mg total) by mouth daily for 14 days (Patient not taking: Reported on 1/10/2024), Disp: 14 capsule, Rfl: 0    sucralfate (CARAFATE) 1 g tablet, Take 1 tablet (1 g total) by mouth 4 (four) times a day for 7 days (Patient not taking: Reported on 1/10/2024), Disp: 28 tablet, Rfl: 0    valsartan (DIOVAN) 160 mg tablet, Take 1 tablet (160 mg total) by mouth daily (Patient not taking: Reported on 6/2/2025), Disp: 30 tablet, Rfl: 0    "

## 2025-06-02 NOTE — PATIENT INSTRUCTIONS
Starting celecoxib 200 mg 1 tablet twice a day for 5 days and thereafter you can use it as needed.  This is anti-inflammatory medication take it after you eat something so it does not irritate your stomach.  If it does irritate your stomach you will use Pepcid as needed which is over-the-counter.  You will start taking prednisone 20 mg 1 tablet twice a day for 5 days.  This will also help with inflammation.  You will obtain the x-rays of the right elbow.  You can use baclofen as needed every 8 hours preferably at nighttime because this medication can make you sleepy.  Will follow-up with orthopedic specialist if the symptoms are persistent.  Follow-up if not improved

## 2025-06-02 NOTE — LETTER
June 2, 2025     Patient: Perla Mccall  YOB: 1960  Date of Visit: 6/2/2025      To Whom it May Concern:    Perla Mccall is under my professional care. Perla was seen in my office on 6/2/2025. Perla may return to work on 6/5/2025 with limitations to not lift more than 20 lbs, unable to mop or use right arm repeatedly.    If you have any questions or concerns, please don't hesitate to call.         Sincerely,          Makenna Roland MD        CC: No Recipients

## 2025-06-09 ENCOUNTER — TELEPHONE (OUTPATIENT)
Age: 65
End: 2025-06-09

## 2025-06-09 NOTE — TELEPHONE ENCOUNTER
"Pt called and her return to work note was date 06/5 but her boss told her not to come in with those restrictions so she needs a work note excusing her from 06/2- her appointment on 06/13. She has tendonitis in her elbox and notes says not to lift over 20lbs but patient stated \"she can't lift over 1lb\" and she works overnight mopping. She has an order for an X-ray but has not completed as of yet.  Please call pt when note is completed so her son can  for her, she doesn't use her mychart. And if you have any questions.  Thank you   "

## 2025-06-09 NOTE — TELEPHONE ENCOUNTER
Manager can tell her not to come, and they can document that this is because of the limitations however I am not excusing her from work completely I am recommending  accommodations.

## 2025-06-09 NOTE — TELEPHONE ENCOUNTER
Patient called to follow up on this morning's message.  Please advise if she should be seen sooner than Friday or if note can be updated. Thank you!

## 2025-06-09 NOTE — TELEPHONE ENCOUNTER
Patient notified of this. She wants a modification to the note stating that she can not lift more then 1 pound because she can't even lift a pound.

## 2025-06-10 PROBLEM — M77.00 MEDIAL EPICONDYLITIS: Status: ACTIVE | Noted: 2025-06-10

## 2025-06-10 NOTE — PROGRESS NOTES
"Name: Perla Mccall      : 1960      MRN: [id number]  Encounter Provider: Lori Fall MD  Encounter Date: 2025   Encounter department: [facility] FAMILY MEDICINE  :  Assessment & Plan  Annual physical exam  Check routine labs declines immunizations     Orders:    CBC and differential; Future    Comprehensive metabolic panel; Future    Lipid panel; Future    TSH, 3rd generation; Future    Urinalysis with microscopic    Hemoglobin A1C; Future    Essential hypertension  Pt not taking meds  very uncontrolled did not like valsartan felt it made her \"whoozy\" will try metoprolol 50m g xl follow up 1 mo      Orders:    metoprolol succinate (TOPROL-XL) 50 mg 24 hr tablet; Take 1 tablet (50 mg total) by mouth daily    Need for hepatitis C screening test    Orders:    Hepatitis C Antibody; Future    Postmenopausal  Needs dexa  Orders:    DXA bone density spine hip and pelvis; Future    Screening for malignant neoplasm of colon  Ordered mammo  Orders:    Ambulatory Referral to Gastroenterology; Future    Encounter for screening mammogram for malignant neoplasm of breast  Needs colonoscopy  Orders:    Mammo screening bilateral w 3d and cad; Future    Encounter for immunization  Recommend adacel shingrix        Medial epicondylitis of right elbow  Resolved needs note  to rtw no restrictions                 History of Present Illness   Patient here for annual physical Pt is here for interval visit and evaluation of multiple medical problems, review of medications, labs, Health Maintenance and any recent specialty consults          Review of Systems   Constitutional:  Negative for appetite change, chills, fatigue and fever.   Respiratory:  Negative for cough, chest tightness and shortness of breath.    Cardiovascular:  Negative for chest pain, palpitations and leg swelling.   Gastrointestinal:  Negative for abdominal pain, constipation, diarrhea, nausea and vomiting.   Genitourinary:  Negative for " "difficulty urinating and frequency.   Musculoskeletal:  Negative for arthralgias, back pain, gait problem and neck pain.   Skin:  Negative for rash.   Neurological:  Negative for dizziness, weakness, light-headedness, numbness and headaches.   Hematological:  Does not bruise/bleed easily.   Psychiatric/Behavioral:  Negative for dysphoric mood and sleep disturbance. The patient is not nervous/anxious.        Objective   BP (!) 178/102   Pulse 65   Temp 98 °F (36.7 °C) (Tympanic)   Resp 16   Ht 5' 5.5\" (1.664 m)   Wt 76.7 kg (169 lb)   SpO2 98%   BMI 27.70 kg/m²      Physical Exam  Vitals and nursing note reviewed.   Constitutional:       General: She is not in acute distress.     Appearance: Normal appearance. She is well-developed. She is obese.   HENT:      Head: Normocephalic and atraumatic.      Right Ear: Tympanic membrane, ear canal and external ear normal.      Left Ear: Tympanic membrane, ear canal and external ear normal.      Nose: Nose normal.      Mouth/Throat:      Mouth: Mucous membranes are moist.      Pharynx: Oropharynx is clear. No oropharyngeal exudate or posterior oropharyngeal erythema.     Eyes:      Extraocular Movements: Extraocular movements intact.      Conjunctiva/sclera: Conjunctivae normal.      Pupils: Pupils are equal, round, and reactive to light.       Cardiovascular:      Rate and Rhythm: Normal rate and regular rhythm.      Heart sounds: Normal heart sounds. No murmur heard.  Pulmonary:      Effort: Pulmonary effort is normal. No respiratory distress.      Breath sounds: Normal breath sounds. No wheezing or rales.   Chest:   Breasts:     Right: Normal. No swelling, bleeding, inverted nipple, mass, nipple discharge, skin change or tenderness.      Left: Normal. No swelling, bleeding, inverted nipple, mass, nipple discharge, skin change or tenderness.   Abdominal:      General: Bowel sounds are normal. There is no distension.      Palpations: Abdomen is soft. There is no mass. "      Tenderness: There is no abdominal tenderness. There is no right CVA tenderness, left CVA tenderness, guarding or rebound.      Hernia: No hernia is present.     Musculoskeletal:         General: No swelling or tenderness. Normal range of motion.      Cervical back: Normal range of motion and neck supple.      Right lower leg: No edema.      Left lower leg: No edema.     Skin:     General: Skin is warm and dry.      Capillary Refill: Capillary refill takes less than 2 seconds.      Findings: No rash.     Neurological:      General: No focal deficit present.      Mental Status: She is alert and oriented to person, place, and time.      Cranial Nerves: No cranial nerve deficit.      Sensory: No sensory deficit.      Motor: No weakness.      Coordination: Coordination normal.      Gait: Gait normal.      Deep Tendon Reflexes: Reflexes normal.     Psychiatric:         Mood and Affect: Mood normal.         Behavior: Behavior normal.

## 2025-06-10 NOTE — TELEPHONE ENCOUNTER
Patient called back stating she does not need the note and will wait until the appointment on 6/13.

## 2025-06-13 ENCOUNTER — OFFICE VISIT (OUTPATIENT)
Dept: FAMILY MEDICINE CLINIC | Facility: CLINIC | Age: 65
End: 2025-06-13
Payer: COMMERCIAL

## 2025-06-13 VITALS
WEIGHT: 169 LBS | HEART RATE: 65 BPM | DIASTOLIC BLOOD PRESSURE: 102 MMHG | BODY MASS INDEX: 27.16 KG/M2 | SYSTOLIC BLOOD PRESSURE: 178 MMHG | RESPIRATION RATE: 16 BRPM | OXYGEN SATURATION: 98 % | HEIGHT: 66 IN | TEMPERATURE: 98 F

## 2025-06-13 DIAGNOSIS — Z11.59 NEED FOR HEPATITIS C SCREENING TEST: ICD-10-CM

## 2025-06-13 DIAGNOSIS — Z00.00 ANNUAL PHYSICAL EXAM: Primary | ICD-10-CM

## 2025-06-13 DIAGNOSIS — Z12.11 SCREENING FOR MALIGNANT NEOPLASM OF COLON: ICD-10-CM

## 2025-06-13 DIAGNOSIS — M77.01 MEDIAL EPICONDYLITIS OF RIGHT ELBOW: ICD-10-CM

## 2025-06-13 DIAGNOSIS — I10 ESSENTIAL HYPERTENSION: ICD-10-CM

## 2025-06-13 DIAGNOSIS — Z23 ENCOUNTER FOR IMMUNIZATION: ICD-10-CM

## 2025-06-13 DIAGNOSIS — Z78.0 POSTMENOPAUSAL: ICD-10-CM

## 2025-06-13 DIAGNOSIS — Z12.31 ENCOUNTER FOR SCREENING MAMMOGRAM FOR MALIGNANT NEOPLASM OF BREAST: ICD-10-CM

## 2025-06-13 PROCEDURE — 99213 OFFICE O/P EST LOW 20 MIN: CPT | Performed by: FAMILY MEDICINE

## 2025-06-13 PROCEDURE — 99396 PREV VISIT EST AGE 40-64: CPT | Performed by: FAMILY MEDICINE

## 2025-06-13 RX ORDER — METOPROLOL SUCCINATE 50 MG/1
50 TABLET, EXTENDED RELEASE ORAL DAILY
Qty: 30 TABLET | Refills: 5 | Status: SHIPPED | OUTPATIENT
Start: 2025-06-13